# Patient Record
Sex: FEMALE | Race: WHITE | URBAN - METROPOLITAN AREA
[De-identification: names, ages, dates, MRNs, and addresses within clinical notes are randomized per-mention and may not be internally consistent; named-entity substitution may affect disease eponyms.]

---

## 2019-05-10 ENCOUNTER — APPOINTMENT (RX ONLY)
Dept: URBAN - METROPOLITAN AREA CLINIC 129 | Facility: CLINIC | Age: 79
Setting detail: DERMATOLOGY
End: 2019-05-10

## 2019-05-10 DIAGNOSIS — L57.8 OTHER SKIN CHANGES DUE TO CHRONIC EXPOSURE TO NONIONIZING RADIATION: ICD-10-CM

## 2019-05-10 PROBLEM — Z85.820 PERSONAL HISTORY OF MALIGNANT MELANOMA OF SKIN: Status: ACTIVE | Noted: 2019-05-10

## 2019-05-10 PROBLEM — Z85.828 PERSONAL HISTORY OF OTHER MALIGNANT NEOPLASM OF SKIN: Status: ACTIVE | Noted: 2019-05-10

## 2019-05-10 PROBLEM — I10 ESSENTIAL (PRIMARY) HYPERTENSION: Status: ACTIVE | Noted: 2019-05-10

## 2019-05-10 PROBLEM — D48.5 NEOPLASM OF UNCERTAIN BEHAVIOR OF SKIN: Status: ACTIVE | Noted: 2019-05-10

## 2019-05-10 PROBLEM — M12.9 ARTHROPATHY, UNSPECIFIED: Status: ACTIVE | Noted: 2019-05-10

## 2019-05-10 PROCEDURE — 99202 OFFICE O/P NEW SF 15 MIN: CPT | Mod: 25

## 2019-05-10 PROCEDURE — ? BIOPSY BY SHAVE METHOD

## 2019-05-10 PROCEDURE — 11102 TANGNTL BX SKIN SINGLE LES: CPT

## 2019-05-10 PROCEDURE — ? COUNSELING

## 2019-05-10 ASSESSMENT — LOCATION ZONE DERM
LOCATION ZONE: ARM
LOCATION ZONE: FACE
LOCATION ZONE: NECK

## 2019-05-10 ASSESSMENT — LOCATION DETAILED DESCRIPTION DERM
LOCATION DETAILED: RIGHT PROXIMAL DORSAL FOREARM
LOCATION DETAILED: LEFT INFERIOR LATERAL NECK
LOCATION DETAILED: LEFT INFERIOR CENTRAL MALAR CHEEK
LOCATION DETAILED: LEFT PROXIMAL DORSAL FOREARM

## 2019-05-10 ASSESSMENT — LOCATION SIMPLE DESCRIPTION DERM
LOCATION SIMPLE: LEFT ANTERIOR NECK
LOCATION SIMPLE: RIGHT FOREARM
LOCATION SIMPLE: LEFT FOREARM
LOCATION SIMPLE: LEFT CHEEK

## 2019-05-10 NOTE — PROCEDURE: BIOPSY BY SHAVE METHOD
Dressing: bandage
Post-Care Instructions: I reviewed with the patient in detail post-care instructions. Patient is to keep the biopsy site dry overnight, and then apply bacitracin twice daily until healed. Patient may apply hydrogen peroxide soaks to remove any crusting.
Anesthesia Volume In Cc (Will Not Render If 0): 0.5
Was A Bandage Applied: Yes
Silver Nitrate Text: The wound bed was treated with silver nitrate after the biopsy was performed.
Notification Instructions: Patient will be notified of biopsy results. However, patient instructed to call the office if not contacted within 2 weeks.
Curettage Text: The wound bed was treated with curettage after the biopsy was performed.
Detail Level: Detailed
Lab: 6
Bill For Surgical Tray: no
Hemostasis: Drysol
Biopsy Method: double edge Personna blade
Consent: Written consent was obtained and risks were reviewed including but not limited to scarring, infection, bleeding, scabbing, incomplete removal, nerve damage and allergy to anesthesia.
Size Of Lesion In Cm: 0
Anesthesia Type: 1% lidocaine with 1:100,000 epinephrine
Cryotherapy Text: The wound bed was treated with cryotherapy after the biopsy was performed.
Electrodesiccation Text: The wound bed was treated with electrodesiccation after the biopsy was performed.
Lab Facility: [id number]
Wound Care: Petrolatum
Biopsy Type: H and E
Depth Of Biopsy: dermis
Electrodesiccation And Curettage Text: The wound bed was treated with electrodesiccation and curettage after the biopsy was performed.
Billing Type: Third-Party Bill
Type Of Destruction Used: Curettage

## 2019-06-24 ENCOUNTER — APPOINTMENT (RX ONLY)
Dept: URBAN - METROPOLITAN AREA CLINIC 133 | Facility: CLINIC | Age: 79
Setting detail: DERMATOLOGY
End: 2019-06-24

## 2019-06-24 PROBLEM — C44.42 SQUAMOUS CELL CARCINOMA OF SKIN OF SCALP AND NECK: Status: ACTIVE | Noted: 2019-06-24

## 2019-06-24 PROCEDURE — 13131 CMPLX RPR F/C/C/M/N/AX/G/H/F: CPT

## 2019-06-24 PROCEDURE — ? MOHS SURGERY

## 2019-06-24 PROCEDURE — ? PRESCRIPTION

## 2019-06-24 PROCEDURE — 17311 MOHS 1 STAGE H/N/HF/G: CPT

## 2019-06-24 RX ORDER — DOXYCYCLINE HYCLATE 100 MG/1
CAPSULE, GELATIN COATED ORAL
Qty: 20 | Refills: 0 | COMMUNITY
Start: 2019-06-24

## 2019-06-24 RX ORDER — GENTAMICIN SULFATE 1 MG/G
OINTMENT TOPICAL
Qty: 1 | Refills: 0 | COMMUNITY
Start: 2019-06-24

## 2019-06-24 RX ADMIN — DOXYCYCLINE HYCLATE: 100 CAPSULE, GELATIN COATED ORAL at 19:42

## 2019-06-24 RX ADMIN — GENTAMICIN SULFATE: 1 OINTMENT TOPICAL at 19:42

## 2019-06-24 NOTE — PROCEDURE: MOHS SURGERY

## 2019-07-10 ENCOUNTER — APPOINTMENT (RX ONLY)
Dept: URBAN - METROPOLITAN AREA CLINIC 129 | Facility: CLINIC | Age: 79
Setting detail: DERMATOLOGY
End: 2019-07-10

## 2019-07-10 DIAGNOSIS — L82.1 OTHER SEBORRHEIC KERATOSIS: ICD-10-CM

## 2019-07-10 DIAGNOSIS — L57.0 ACTINIC KERATOSIS: ICD-10-CM

## 2019-07-10 DIAGNOSIS — L85.3 XEROSIS CUTIS: ICD-10-CM

## 2019-07-10 DIAGNOSIS — D22 MELANOCYTIC NEVI: ICD-10-CM

## 2019-07-10 DIAGNOSIS — L81.4 OTHER MELANIN HYPERPIGMENTATION: ICD-10-CM

## 2019-07-10 DIAGNOSIS — L57.8 OTHER SKIN CHANGES DUE TO CHRONIC EXPOSURE TO NONIONIZING RADIATION: ICD-10-CM

## 2019-07-10 DIAGNOSIS — Z48.817 ENCOUNTER FOR SURGICAL AFTERCARE FOLLOWING SURGERY ON THE SKIN AND SUBCUTANEOUS TISSUE: ICD-10-CM | Status: RESOLVING

## 2019-07-10 DIAGNOSIS — D18.0 HEMANGIOMA: ICD-10-CM

## 2019-07-10 DIAGNOSIS — Z85.828 PERSONAL HISTORY OF OTHER MALIGNANT NEOPLASM OF SKIN: ICD-10-CM

## 2019-07-10 PROBLEM — D22.5 MELANOCYTIC NEVI OF TRUNK: Status: ACTIVE | Noted: 2019-07-10

## 2019-07-10 PROBLEM — D18.01 HEMANGIOMA OF SKIN AND SUBCUTANEOUS TISSUE: Status: ACTIVE | Noted: 2019-07-10

## 2019-07-10 PROCEDURE — ? PRESCRIPTION SAMPLES PROVIDED

## 2019-07-10 PROCEDURE — 99214 OFFICE O/P EST MOD 30 MIN: CPT

## 2019-07-10 PROCEDURE — ? ADDITIONAL NOTES

## 2019-07-10 PROCEDURE — ? POST-OP WOUND CHECK

## 2019-07-10 PROCEDURE — ? SUNSCREEN RECOMMENDATIONS

## 2019-07-10 PROCEDURE — ? INVENTORY

## 2019-07-10 PROCEDURE — ? PRESCRIPTION

## 2019-07-10 PROCEDURE — ? COUNSELING

## 2019-07-10 RX ORDER — AMMONIUM LACTATE 12 G/100G
CREAM TOPICAL
Qty: 1 | Refills: 3 | Status: ERX | COMMUNITY
Start: 2019-07-10

## 2019-07-10 RX ADMIN — AMMONIUM LACTATE: 12 CREAM TOPICAL at 13:08

## 2019-07-10 ASSESSMENT — LOCATION DETAILED DESCRIPTION DERM
LOCATION DETAILED: LEFT MID-UPPER BACK
LOCATION DETAILED: RIGHT SUPERIOR LATERAL NECK
LOCATION DETAILED: LEFT PROXIMAL PRETIBIAL REGION
LOCATION DETAILED: LEFT ANTERIOR DISTAL THIGH
LOCATION DETAILED: EPIGASTRIC SKIN
LOCATION DETAILED: LEFT CENTRAL ZYGOMA
LOCATION DETAILED: RIGHT PROXIMAL PRETIBIAL REGION
LOCATION DETAILED: LEFT VENTRAL PROXIMAL FOREARM
LOCATION DETAILED: LEFT SUPERIOR UPPER BACK
LOCATION DETAILED: RIGHT ANTERIOR DISTAL THIGH
LOCATION DETAILED: RIGHT PROXIMAL DORSAL FOREARM
LOCATION DETAILED: LEFT PROXIMAL DORSAL FOREARM
LOCATION DETAILED: NASAL TIP
LOCATION DETAILED: RIGHT SUPERIOR UPPER BACK
LOCATION DETAILED: RIGHT VENTRAL PROXIMAL FOREARM

## 2019-07-10 ASSESSMENT — LOCATION SIMPLE DESCRIPTION DERM
LOCATION SIMPLE: LEFT ZYGOMA
LOCATION SIMPLE: LEFT PRETIBIAL REGION
LOCATION SIMPLE: ABDOMEN
LOCATION SIMPLE: LEFT THIGH
LOCATION SIMPLE: LEFT UPPER BACK
LOCATION SIMPLE: NOSE
LOCATION SIMPLE: NECK
LOCATION SIMPLE: RIGHT THIGH
LOCATION SIMPLE: RIGHT PRETIBIAL REGION
LOCATION SIMPLE: LEFT FOREARM
LOCATION SIMPLE: RIGHT FOREARM
LOCATION SIMPLE: RIGHT UPPER BACK

## 2019-07-10 ASSESSMENT — LOCATION ZONE DERM
LOCATION ZONE: NOSE
LOCATION ZONE: NECK
LOCATION ZONE: TRUNK
LOCATION ZONE: FACE
LOCATION ZONE: LEG
LOCATION ZONE: ARM

## 2019-07-10 NOTE — PROCEDURE: MIPS QUALITY
Quality 47: Advance Care Plan: Advance Care Planning discussed and documented; advance care plan or surrogate decision maker documented in the medical record.
Detail Level: Detailed
Quality 110: Preventive Care And Screening: Influenza Immunization: Influenza Immunization Administered during Influenza season
Quality 130: Documentation Of Current Medications In The Medical Record: Current Medications Documented
Quality 402: Tobacco Use And Help With Quitting Among Adolescents: Patient screened for tobacco and is an ex-smoker
Quality 431: Preventive Care And Screening: Unhealthy Alcohol Use - Screening: Patient screened for unhealthy alcohol use using a single question and scores less than 2 times per year
Quality 111:Pneumonia Vaccination Status For Older Adults: Pneumococcal Vaccination Previously Received

## 2019-07-10 NOTE — PROCEDURE: POST-OP WOUND CHECK
Detail Level: Detailed
Add 68611 Cpt? (Important Note: In 2017 The Use Of 00759 Is Being Tracked By Cms To Determine Future Global Period Reimbursement For Global Periods): no

## 2019-10-26 NOTE — PROCEDURE: MOHS SURGERY
no Asc Procedure Text (E): After obtaining clear surgical margins the patient was sent to an ASC for surgical repair.  The patient understands they will receive post-surgical care and follow-up from the ASC physician.